# Patient Record
Sex: FEMALE | Race: WHITE | NOT HISPANIC OR LATINO | URBAN - METROPOLITAN AREA
[De-identification: names, ages, dates, MRNs, and addresses within clinical notes are randomized per-mention and may not be internally consistent; named-entity substitution may affect disease eponyms.]

---

## 2017-06-01 PROBLEM — Z00.00 ENCOUNTER FOR PREVENTIVE HEALTH EXAMINATION: Status: ACTIVE | Noted: 2017-06-01

## 2017-06-02 ENCOUNTER — OUTPATIENT (OUTPATIENT)
Dept: OUTPATIENT SERVICES | Facility: HOSPITAL | Age: 67
LOS: 1 days | End: 2017-06-02

## 2017-06-12 ENCOUNTER — OUTPATIENT (OUTPATIENT)
Dept: OUTPATIENT SERVICES | Facility: HOSPITAL | Age: 67
LOS: 1 days | End: 2017-06-12

## 2017-06-15 ENCOUNTER — APPOINTMENT (OUTPATIENT)
Dept: CARDIOLOGY | Facility: CLINIC | Age: 67
End: 2017-06-15

## 2018-01-17 ENCOUNTER — RECORD ABSTRACTING (OUTPATIENT)
Age: 68
End: 2018-01-17

## 2018-03-05 ENCOUNTER — OUTPATIENT (OUTPATIENT)
Dept: OUTPATIENT SERVICES | Facility: HOSPITAL | Age: 68
LOS: 1 days | End: 2018-03-05

## 2018-04-19 ENCOUNTER — OUTPATIENT (OUTPATIENT)
Dept: OUTPATIENT SERVICES | Facility: HOSPITAL | Age: 68
LOS: 1 days | End: 2018-04-19
Payer: MEDICARE

## 2018-04-19 PROCEDURE — 76536 US EXAM OF HEAD AND NECK: CPT | Mod: 26

## 2018-06-20 ENCOUNTER — RECORD ABSTRACTING (OUTPATIENT)
Age: 68
End: 2018-06-20

## 2018-06-20 DIAGNOSIS — Z87.891 PERSONAL HISTORY OF NICOTINE DEPENDENCE: ICD-10-CM

## 2018-06-20 DIAGNOSIS — Z82.49 FAMILY HISTORY OF ISCHEMIC HEART DISEASE AND OTHER DISEASES OF THE CIRCULATORY SYSTEM: ICD-10-CM

## 2018-06-20 DIAGNOSIS — Z78.9 OTHER SPECIFIED HEALTH STATUS: ICD-10-CM

## 2018-06-20 DIAGNOSIS — R21 RASH AND OTHER NONSPECIFIC SKIN ERUPTION: ICD-10-CM

## 2018-06-20 DIAGNOSIS — H26.9 UNSPECIFIED CATARACT: ICD-10-CM

## 2018-06-20 DIAGNOSIS — Z82.3 FAMILY HISTORY OF STROKE: ICD-10-CM

## 2018-06-23 ENCOUNTER — OUTPATIENT (OUTPATIENT)
Dept: OUTPATIENT SERVICES | Facility: HOSPITAL | Age: 68
LOS: 1 days | End: 2018-06-23

## 2018-06-27 ENCOUNTER — TRANSCRIPTION ENCOUNTER (OUTPATIENT)
Age: 68
End: 2018-06-27

## 2018-06-27 ENCOUNTER — NON-APPOINTMENT (OUTPATIENT)
Age: 68
End: 2018-06-27

## 2018-06-27 ENCOUNTER — APPOINTMENT (OUTPATIENT)
Dept: CARDIOLOGY | Facility: CLINIC | Age: 68
End: 2018-06-27
Payer: MEDICARE

## 2018-06-27 VITALS
DIASTOLIC BLOOD PRESSURE: 83 MMHG | BODY MASS INDEX: 27.99 KG/M2 | HEART RATE: 72 BPM | HEIGHT: 65 IN | WEIGHT: 168 LBS | SYSTOLIC BLOOD PRESSURE: 133 MMHG

## 2018-06-27 PROCEDURE — 99213 OFFICE O/P EST LOW 20 MIN: CPT

## 2018-06-27 PROCEDURE — 93000 ELECTROCARDIOGRAM COMPLETE: CPT

## 2018-06-27 RX ORDER — ADHESIVE TAPE 3"X 2.3 YD
50 MCG TAPE, NON-MEDICATED TOPICAL
Refills: 0 | Status: ACTIVE | COMMUNITY

## 2018-06-27 RX ORDER — LEVOTHYROXINE SODIUM 75 UG/1
75 TABLET ORAL DAILY
Refills: 0 | Status: ACTIVE | COMMUNITY

## 2018-09-12 ENCOUNTER — APPOINTMENT (OUTPATIENT)
Dept: CARDIOLOGY | Facility: CLINIC | Age: 68
End: 2018-09-12
Payer: MEDICARE

## 2018-09-12 PROCEDURE — 93306 TTE W/DOPPLER COMPLETE: CPT

## 2018-09-12 PROCEDURE — 93880 EXTRACRANIAL BILAT STUDY: CPT

## 2018-09-20 ENCOUNTER — APPOINTMENT (OUTPATIENT)
Dept: CARDIOLOGY | Facility: CLINIC | Age: 68
End: 2018-09-20
Payer: MEDICARE

## 2018-09-20 VITALS
BODY MASS INDEX: 27.49 KG/M2 | WEIGHT: 165 LBS | HEIGHT: 65 IN | DIASTOLIC BLOOD PRESSURE: 70 MMHG | OXYGEN SATURATION: 98 % | SYSTOLIC BLOOD PRESSURE: 140 MMHG | HEART RATE: 69 BPM

## 2018-09-20 PROCEDURE — 99214 OFFICE O/P EST MOD 30 MIN: CPT

## 2018-09-20 RX ORDER — ATORVASTATIN CALCIUM 20 MG/1
20 TABLET, FILM COATED ORAL DAILY
Qty: 90 | Refills: 1 | Status: DISCONTINUED | COMMUNITY
Start: 1900-01-01 | End: 2018-09-20

## 2018-12-20 ENCOUNTER — OUTPATIENT (OUTPATIENT)
Dept: OUTPATIENT SERVICES | Facility: HOSPITAL | Age: 68
LOS: 1 days | End: 2018-12-20

## 2019-03-13 ENCOUNTER — MEDICATION RENEWAL (OUTPATIENT)
Age: 69
End: 2019-03-13

## 2019-03-16 ENCOUNTER — OUTPATIENT (OUTPATIENT)
Dept: OUTPATIENT SERVICES | Facility: HOSPITAL | Age: 69
LOS: 1 days | End: 2019-03-16
Payer: MEDICARE

## 2019-03-16 PROCEDURE — 76536 US EXAM OF HEAD AND NECK: CPT | Mod: 26

## 2019-03-21 ENCOUNTER — APPOINTMENT (OUTPATIENT)
Dept: CARDIOLOGY | Facility: CLINIC | Age: 69
End: 2019-03-21
Payer: MEDICARE

## 2019-03-21 VITALS
DIASTOLIC BLOOD PRESSURE: 70 MMHG | SYSTOLIC BLOOD PRESSURE: 124 MMHG | WEIGHT: 163 LBS | HEART RATE: 71 BPM | OXYGEN SATURATION: 96 % | BODY MASS INDEX: 27.12 KG/M2

## 2019-03-21 DIAGNOSIS — E03.9 HYPOTHYROIDISM, UNSPECIFIED: ICD-10-CM

## 2019-03-21 PROCEDURE — 99214 OFFICE O/P EST MOD 30 MIN: CPT

## 2019-03-21 NOTE — ADDENDUM
[FreeTextEntry1] : Please note the patient was reviewed with DESIREE Katz.\par I was physically present during the service of the patient\par I was directly involved in the management plan and recommendations of care provided to the patient. \par I personally reviewed the history and physical exam and examination as documented by the PA above.\par 03/21/2019\par

## 2019-03-21 NOTE — ASSESSMENT
[FreeTextEntry1] : Abnormal EKG. \par Echocardiogram reviewed.  9-18\par Normal left ventricular function. \par Mild valvular heart disease.\par \par Hyperlipidemia. \par Atherosclerosis\par Possible ADR, return to crestor, with occasional myalgia.\par Will decrease to 10 mg if symptoms progressive, and notify me.\par Adjunctive dietary measures. \par \par \par Up-to-date on primary care maintenance to include colonoscopy which occurred 3 years ago, has regular gynecology followup with Pap smear, breast exam, mammogram, ophtho.\par \par She is interested in possible second pneumonia vaccination however it is unclear which vaccine was had in 2015, she will contact her pharmacy and determine if it was Pneumovax or Prevnar.\par \par She will have six-month followup or as otherwise dictated by change in her symptoms.

## 2019-03-21 NOTE — HISTORY OF PRESENT ILLNESS
[FreeTextEntry1] : ERIBERTO RAMOS  is a 68year old  F\par \par \par Hx hypothyroidism with thyroid nodules, negative thyroid biopsy, hyperlipidemia, atherosclerosis, mild VHD and osteopenia/osteoporosis. \par \par There is no prior history of a clinical myocardial infarction, coronary revascularization. \par There is no history of symptomatic congestive heart failure rheumatic heart disease or valvular disease.\par There is no history of symptomatic arrhythmias including atrial fibrillation.\par \par There is chest burning, and more awareness of her muscles on the atorvastatin. \par There is no exertional chest pain, pressure or discomfort. \par There is no significant dyspnea on exertion or orthopnea. \par There are no symptomatic palpitations, lightheadedness, dizziness or syncope.\par \par She has chronic knee pain and is planning on pursuing left knee replacement with Dr. Culver at some point in the future, this has remained unchanged, likely will be done around summer time. \par She walks as tolerated by knee pain. \par \par There is intermittent complaints of lower extremity and upper extremity myalgias, which is not frequent. The patient elects to remain on her current dose of Crestor which improved cholesterol profile however will have her dose of Crestor if the symptoms become persistent and notify me should they improve.\par \par EKG demonstrates normal sinus rhythm with possible inferior MI and possible anteroseptal MI. \par \par Labs dated March 16, 2019 with a normal CMP, total cholesterol 191, , he still 64, triglycerides 87, . TSH 1.64 free thyroxine 1.89.\par \par June 2018. Hemoglobin 13.7, potassium 3.9, HDL 71, , total cholesterol 212, TSH 2.0. \par  \par Carotid Doppler. September 2018. Mild plaque. Nonobstructive disease. \par Echocardiogram. Normal left ventricular function. Mild mitral regurgitation. Mild tricuspid regurgitation. \par

## 2019-03-21 NOTE — REVIEW OF SYSTEMS
[see HPI] : see HPI [Joint Pain] : joint pain [Joint Stiffness] : joint stiffness [Muscle Cramps] : muscle cramps [Negative] : Heme/Lymph

## 2019-03-21 NOTE — PHYSICAL EXAM
[General Appearance - Well Developed] : well developed [Well Groomed] : well groomed [Normal Appearance] : normal appearance [General Appearance - Well Nourished] : well nourished [General Appearance - In No Acute Distress] : no acute distress [No Deformities] : no deformities [Normal Conjunctiva] : the conjunctiva exhibited no abnormalities [Eyelids - No Xanthelasma] : the eyelids demonstrated no xanthelasmas [Normal Oral Mucosa] : normal oral mucosa [No Oral Pallor] : no oral pallor [No Oral Cyanosis] : no oral cyanosis [Auscultation Breath Sounds / Voice Sounds] : lungs were clear to auscultation bilaterally [Respiration, Rhythm And Depth] : normal respiratory rhythm and effort [Heart Rate And Rhythm] : heart rate and rhythm were normal [Exaggerated Use Of Accessory Muscles For Inspiration] : no accessory muscle use [Heart Sounds] : normal S1 and S2 [Murmurs] : no murmurs present [Abdomen Tenderness] : non-tender [Abdomen Soft] : soft [Abdomen Mass (___ Cm)] : no abdominal mass palpated [Abnormal Walk] : normal gait [Gait - Sufficient For Exercise Testing] : the gait was sufficient for exercise testing [Cyanosis, Localized] : no localized cyanosis [Nail Clubbing] : no clubbing of the fingernails [Petechial Hemorrhages (___cm)] : no petechial hemorrhages [Skin Color & Pigmentation] : normal skin color and pigmentation [No Venous Stasis] : no venous stasis [] : no rash [No Skin Ulcers] : no skin ulcer [Skin Lesions] : no skin lesions [No Xanthoma] : no  xanthoma was observed [Affect] : the affect was normal [Oriented To Time, Place, And Person] : oriented to person, place, and time [No Anxiety] : not feeling anxious [Mood] : the mood was normal [FreeTextEntry1] : dec upstroke

## 2019-03-21 NOTE — REASON FOR VISIT
[Hyperlipidemia] : hyperlipidemia [Abnormal ECG] : an abnormal ECG [Consultation] : a consultation regarding [Medication Management] : Medication management

## 2019-03-23 ENCOUNTER — TRANSCRIPTION ENCOUNTER (OUTPATIENT)
Age: 69
End: 2019-03-23

## 2019-09-21 ENCOUNTER — OUTPATIENT (OUTPATIENT)
Dept: OUTPATIENT SERVICES | Facility: HOSPITAL | Age: 69
LOS: 1 days | End: 2019-09-21

## 2019-09-24 ENCOUNTER — APPOINTMENT (OUTPATIENT)
Dept: CARDIOLOGY | Facility: CLINIC | Age: 69
End: 2019-09-24
Payer: MEDICARE

## 2019-09-24 ENCOUNTER — NON-APPOINTMENT (OUTPATIENT)
Age: 69
End: 2019-09-24

## 2019-09-24 VITALS
HEIGHT: 65 IN | WEIGHT: 157 LBS | BODY MASS INDEX: 26.16 KG/M2 | OXYGEN SATURATION: 99 % | DIASTOLIC BLOOD PRESSURE: 60 MMHG | SYSTOLIC BLOOD PRESSURE: 132 MMHG | HEART RATE: 74 BPM

## 2019-09-24 PROCEDURE — 99215 OFFICE O/P EST HI 40 MIN: CPT

## 2019-09-24 PROCEDURE — 93000 ELECTROCARDIOGRAM COMPLETE: CPT

## 2019-09-24 NOTE — REVIEW OF SYSTEMS
[see HPI] : see HPI [Joint Pain] : joint pain [Joint Stiffness] : joint stiffness [Muscle Cramps] : muscle cramps [Negative] : Heme/Lymph [Shortness Of Breath] : shortness of breath

## 2019-11-04 ENCOUNTER — APPOINTMENT (OUTPATIENT)
Dept: CARDIOLOGY | Facility: CLINIC | Age: 69
End: 2019-11-04
Payer: MEDICARE

## 2019-11-04 PROCEDURE — 93015 CV STRESS TEST SUPVJ I&R: CPT

## 2019-11-04 PROCEDURE — 93306 TTE W/DOPPLER COMPLETE: CPT

## 2019-11-04 PROCEDURE — A9502: CPT

## 2019-11-04 PROCEDURE — 78452 HT MUSCLE IMAGE SPECT MULT: CPT

## 2019-11-12 ENCOUNTER — APPOINTMENT (OUTPATIENT)
Dept: CARDIOLOGY | Facility: CLINIC | Age: 69
End: 2019-11-12
Payer: MEDICARE

## 2019-11-12 VITALS
SYSTOLIC BLOOD PRESSURE: 126 MMHG | WEIGHT: 160 LBS | HEIGHT: 65 IN | HEART RATE: 76 BPM | BODY MASS INDEX: 26.66 KG/M2 | DIASTOLIC BLOOD PRESSURE: 70 MMHG

## 2019-11-12 DIAGNOSIS — R06.02 SHORTNESS OF BREATH: ICD-10-CM

## 2019-11-12 PROCEDURE — 99214 OFFICE O/P EST MOD 30 MIN: CPT

## 2019-11-12 NOTE — REVIEW OF SYSTEMS
[Shortness Of Breath] : shortness of breath [see HPI] : see HPI [Joint Pain] : joint pain [Joint Stiffness] : joint stiffness [Muscle Cramps] : muscle cramps [Negative] : Heme/Lymph

## 2019-11-12 NOTE — HISTORY OF PRESENT ILLNESS
[FreeTextEntry1] : ERIBERTO RAMOS  is a very pleasant 68 year old  F\par \par \par Hx hypothyroidism with thyroid nodules, negative thyroid biopsy, hyperlipidemia, atherosclerosis, mild VHD and osteopenia/osteoporosis. \par \par There is no prior history of a clinical myocardial infarction, coronary revascularization. \par There is no history of symptomatic congestive heart failure rheumatic heart disease or valvular disease.\par There is no history of symptomatic arrhythmias including atrial fibrillation.\par \par There is intermittent b/l leg cramping at night on rousuvastatin. \par There is no exertional chest pain, pressure or discomfort. \par There are no symptomatic palpitations, lightheadedness, dizziness or syncope.\par \par She has chronic knee pain and is planning on pursuing left knee replacement with Dr. Culver at some point in the near future. \par She walks as tolerated by knee pain. \par \par There is intermittent complaints of lower extremity and upper extremity myalgias, which is not frequent. \par \par  Nuclear stress test 11/04/19.  Lexiscan.  No evidence of ischemia by EKG. Normal myocardial perfusion.  \par \par Echocardiogram 11/04/19.  EF: 60%.  Minimal MR and PI.  MILD TR. Mild diastolic dysfunction.  borderline PHTN.  Color flow shunt consistent with PFO.\par \par No hx of neurological symptoms. \par \par 9/21/19 CBC: WNL. Na+: 139, K+: 2.2, glucose: 89, BUN 17, creatinine: 0.6, calcium colon 10.0. AST: 26, ALT: 22. Cholesterol: 195, LDL: 118, HDL: 60, triglycerides: 86. CK: 131.\par \par Labs dated March 16, 2019 with a normal CMP, total cholesterol 191, , he still 64, triglycerides 87, . TSH 1.64 free thyroxine 1.89.\par \par June 2018. Hemoglobin 13.7, potassium 3.9, HDL 71, , total cholesterol 212, TSH 2.0. \par  \par Carotid Doppler. September 2018. Mild plaque. Nonobstructive disease. \par Echocardiogram. Normal left ventricular function. Mild mitral regurgitation. Mild tricuspid regurgitation. \par

## 2019-11-12 NOTE — ASSESSMENT
[FreeTextEntry1] : \par Echocardiogram reviewed.\par Normal left ventricular function. \par Mild valvular heart disease.\par PFO noted.  No neurological symptoms.  No contraindications to ASA.  Continue. \par Red flag symptoms that would warrant emergent evaluation discussed.  Pt verbalizes understanding. \par \par Hyperlipidemia. \par Atherosclerosis\par Possible ADR, with occasional myalgia.  decrease Crestor to 10mg QD and reevaluate in a few weeks. \par Adjunctive dietary measures. \par \par Nonobstructive carotid disease.  No FND.  Start ASA as above.

## 2019-11-12 NOTE — PHYSICAL EXAM
[General Appearance - Well Developed] : well developed [Well Groomed] : well groomed [Normal Appearance] : normal appearance [General Appearance - Well Nourished] : well nourished [No Deformities] : no deformities [General Appearance - In No Acute Distress] : no acute distress [Normal Conjunctiva] : the conjunctiva exhibited no abnormalities [Eyelids - No Xanthelasma] : the eyelids demonstrated no xanthelasmas [Normal Oral Mucosa] : normal oral mucosa [No Oral Pallor] : no oral pallor [No Oral Cyanosis] : no oral cyanosis [Respiration, Rhythm And Depth] : normal respiratory rhythm and effort [Exaggerated Use Of Accessory Muscles For Inspiration] : no accessory muscle use [Auscultation Breath Sounds / Voice Sounds] : lungs were clear to auscultation bilaterally [Heart Rate And Rhythm] : heart rate and rhythm were normal [Heart Sounds] : normal S1 and S2 [Murmurs] : no murmurs present [Abdomen Soft] : soft [Abdomen Tenderness] : non-tender [Abdomen Mass (___ Cm)] : no abdominal mass palpated [Abnormal Walk] : normal gait [Gait - Sufficient For Exercise Testing] : the gait was sufficient for exercise testing [Nail Clubbing] : no clubbing of the fingernails [Cyanosis, Localized] : no localized cyanosis [Petechial Hemorrhages (___cm)] : no petechial hemorrhages [Skin Color & Pigmentation] : normal skin color and pigmentation [] : no rash [No Venous Stasis] : no venous stasis [Skin Lesions] : no skin lesions [No Skin Ulcers] : no skin ulcer [No Xanthoma] : no  xanthoma was observed [Affect] : the affect was normal [Oriented To Time, Place, And Person] : oriented to person, place, and time [Mood] : the mood was normal [No Anxiety] : not feeling anxious [FreeTextEntry1] : dec upstroke

## 2019-11-12 NOTE — REASON FOR VISIT
[Follow-Up - Clinic] : a clinic follow-up of [Abnormal ECG] : an abnormal ECG [Hyperlipidemia] : hyperlipidemia [Medication Management] : Medication management [FreeTextEntry2] : Review testing.

## 2019-11-12 NOTE — REASON FOR VISIT
[Follow-Up - Clinic] : a clinic follow-up of [Hyperlipidemia] : hyperlipidemia [Abnormal ECG] : an abnormal ECG [Medication Management] : Medication management [FreeTextEntry2] : Review testing.

## 2019-11-12 NOTE — PHYSICAL EXAM
[General Appearance - Well Developed] : well developed [Normal Appearance] : normal appearance [Well Groomed] : well groomed [No Deformities] : no deformities [General Appearance - Well Nourished] : well nourished [General Appearance - In No Acute Distress] : no acute distress [Heart Rate And Rhythm] : heart rate and rhythm were normal [Heart Sounds] : normal S1 and S2 [Murmurs] : no murmurs present [Respiration, Rhythm And Depth] : normal respiratory rhythm and effort [Exaggerated Use Of Accessory Muscles For Inspiration] : no accessory muscle use [Auscultation Breath Sounds / Voice Sounds] : lungs were clear to auscultation bilaterally [Abdomen Soft] : soft [Abdomen Tenderness] : non-tender [Abdomen Mass (___ Cm)] : no abdominal mass palpated [Nail Clubbing] : no clubbing of the fingernails [Cyanosis, Localized] : no localized cyanosis [Petechial Hemorrhages (___cm)] : no petechial hemorrhages [Normal Conjunctiva] : the conjunctiva exhibited no abnormalities [Eyelids - No Xanthelasma] : the eyelids demonstrated no xanthelasmas [Normal Oral Mucosa] : normal oral mucosa [No Oral Pallor] : no oral pallor [No Oral Cyanosis] : no oral cyanosis [Abnormal Walk] : normal gait [Gait - Sufficient For Exercise Testing] : the gait was sufficient for exercise testing [Skin Color & Pigmentation] : normal skin color and pigmentation [No Venous Stasis] : no venous stasis [] : no rash [Skin Lesions] : no skin lesions [No Skin Ulcers] : no skin ulcer [No Xanthoma] : no  xanthoma was observed [Oriented To Time, Place, And Person] : oriented to person, place, and time [Affect] : the affect was normal [Mood] : the mood was normal [No Anxiety] : not feeling anxious [FreeTextEntry1] : dec upstroke

## 2020-01-27 ENCOUNTER — APPOINTMENT (OUTPATIENT)
Dept: ORTHOPEDIC SURGERY | Facility: CLINIC | Age: 70
End: 2020-01-27
Payer: MEDICARE

## 2020-01-27 VITALS
BODY MASS INDEX: 26.66 KG/M2 | WEIGHT: 160 LBS | DIASTOLIC BLOOD PRESSURE: 93 MMHG | HEART RATE: 85 BPM | SYSTOLIC BLOOD PRESSURE: 155 MMHG | HEIGHT: 65 IN

## 2020-01-27 DIAGNOSIS — M19.041 PRIMARY OSTEOARTHRITIS, RIGHT HAND: ICD-10-CM

## 2020-01-27 DIAGNOSIS — Z87.39 PERSONAL HISTORY OF OTHER DISEASES OF THE MUSCULOSKELETAL SYSTEM AND CONNECTIVE TISSUE: ICD-10-CM

## 2020-01-27 DIAGNOSIS — M19.042 PRIMARY OSTEOARTHRITIS, LEFT HAND: ICD-10-CM

## 2020-01-27 DIAGNOSIS — M67.442 GANGLION, LEFT HAND: ICD-10-CM

## 2020-01-27 PROCEDURE — 73130 X-RAY EXAM OF HAND: CPT | Mod: 50

## 2020-01-27 PROCEDURE — 20612 ASPIRATE/INJ GANGLION CYST: CPT | Mod: F1

## 2020-01-27 PROCEDURE — 99203 OFFICE O/P NEW LOW 30 MIN: CPT | Mod: 25

## 2020-01-27 NOTE — PROCEDURE
[FreeTextEntry1] : Using sterile technique, the cyst was aspirated. She understands that the cyst may recur. If it does recur, then she will return to the office to discuss further treatment recommendations.

## 2020-01-27 NOTE — HISTORY OF PRESENT ILLNESS
[Right] : right hand dominant [FreeTextEntry1] : She comes in today for evaluation of bilateral hand and thumb pain that began 3 years ago. She denies any numbness and tingling. She states she occasionally takes Ibuprofen with relief.\par \par She also comes in for evaluation of a left index finger cyst that appeared 1 year ago. She states that she has intermittent pain at the nail bed. She rates this pain a 4 out of 10 at this time. \par \par She had tendon repair surgery in the past.\par \par She was accompanied by her .

## 2020-01-27 NOTE — ADDENDUM
[FreeTextEntry1] : I, Reji Cordova, acted solely as a scribe for Dr. Peña on this date 01/27/2020.\par

## 2020-01-27 NOTE — PHYSICAL EXAM
[de-identified] : - Constitutional: This is a female in no obvious distress. She was accompanied by her .\par - Psych: Patient is alert and oriented to person, place and time.  Patient has a normal mood and affect.\par - Cardiovascular: Normal pulses throughout the upper extremities.  No significant varicosities are noted in the upper extremities. \par - Neuro: Strength and sensation are intact throughout the upper extremities.  Patient has normal coordination.\par - Respiratory:  Patient exhibits no evidence of shortness of breath or difficulty breathing.\par - Skin: No rashes, lesions, or other abnormalities are noted in the upper extremities.\par \par ---\par \par Examination of her bilateral hands demonstrate obvious arthritis.  This is most notable at the DIP joints as well as along the CMC joints of the thumbs.  She has some tenderness along the CMC joints and limitation of terminal flexion and extension.  Examination of her left index finger demonstrates a ganglion cyst emanating from the dorsal aspect of the middle phalanx.  There is some tenderness.  She is neurovascularly intact distally. [de-identified] : AP, lateral oblique radiographs of both hands demonstrates arthritis.  She has right greater than left CMC joint arthritis of the thumbs as well as arthritis of the DIP joints, most notably at the left middle finger.

## 2020-01-27 NOTE — END OF VISIT
[FreeTextEntry3] : All medical record entries made by the Scribe were at my, Dr. Peña, direction and personally dictated by me on 01/27/2020. I have reviewed the chart and agree that the record accurately reflects my personal performances of the history, physical exam, assessment, and plan. I have also personally directed, reviewed, and agreed with the chart.\par

## 2020-01-27 NOTE — DISCUSSION/SUMMARY
[FreeTextEntry1] : She has findings consistent with bilateral hand pain secondary to arthritis, where she is most symptomatic at the CMC joints of the thumbs.  She also has a left index finger ganglion cyst dorsally, proximal to the DIP joint.\par \par I had a discussion regarding today's visit, the diagnosis, and treatment recommendations / options. With regard to her bilateral hand pain secondary to CMC joint arthritis, we discussed treatment options either a cortisone injection or symptomatic treatment.  She deferred a cortisone injection at this time.  She will take anti-inflammatories and apply ice as needed.  If her symptoms worsen, then she will follow-up for a cortisone injection.  \par \par With regard to her left index cyst, she agreed to aspiration.  She understands that there is a relatively high likelihood of recurrence.  If the cyst recurs, she would like to consider surgical removal at some time after her knee surgery.\par \par The patient has agreed to this plan of management and has expressed full understanding.  All questions were fully answered to the patient's satisfaction.\par \par Over 50% of the time spent with the patient was on counseling the patient on the above diagnosis, treatment plan and prognosis.\par

## 2020-02-12 ENCOUNTER — OUTPATIENT (OUTPATIENT)
Dept: OUTPATIENT SERVICES | Facility: HOSPITAL | Age: 70
LOS: 1 days | End: 2020-02-12

## 2020-02-19 ENCOUNTER — APPOINTMENT (OUTPATIENT)
Dept: CARDIOLOGY | Facility: CLINIC | Age: 70
End: 2020-02-19
Payer: MEDICARE

## 2020-02-19 VITALS
OXYGEN SATURATION: 96 % | BODY MASS INDEX: 29.41 KG/M2 | HEIGHT: 63 IN | WEIGHT: 166 LBS | SYSTOLIC BLOOD PRESSURE: 126 MMHG | RESPIRATION RATE: 17 BRPM | DIASTOLIC BLOOD PRESSURE: 70 MMHG | HEART RATE: 82 BPM

## 2020-02-19 DIAGNOSIS — Q21.1 ATRIAL SEPTAL DEFECT: ICD-10-CM

## 2020-02-19 DIAGNOSIS — Z01.810 ENCOUNTER FOR PREPROCEDURAL CARDIOVASCULAR EXAMINATION: ICD-10-CM

## 2020-02-19 PROCEDURE — 99214 OFFICE O/P EST MOD 30 MIN: CPT

## 2020-02-19 RX ORDER — ASPIRIN 81 MG
81 TABLET, DELAYED RELEASE (ENTERIC COATED) ORAL DAILY
Refills: 0 | Status: ACTIVE | COMMUNITY

## 2020-02-19 RX ORDER — MULTIVIT WITH CALCIUM,IRON,MIN
TABLET ORAL
Refills: 0 | Status: DISCONTINUED | COMMUNITY
End: 2020-02-19

## 2020-02-19 NOTE — REVIEW OF SYSTEMS
[Shortness Of Breath] : shortness of breath [Joint Pain] : joint pain [see HPI] : see HPI [Joint Stiffness] : joint stiffness [Muscle Cramps] : muscle cramps [Negative] : Heme/Lymph

## 2020-02-28 NOTE — HISTORY OF PRESENT ILLNESS
[FreeTextEntry1] : ERIBERTO RAMOS  is a very pleasant 69 year old  F\par \par \par Hx hypothyroidism with thyroid nodules, negative thyroid biopsy, hyperlipidemia, atherosclerosis, mild VHD and osteopenia/osteoporosis. \par \par There is no prior history of a clinical myocardial infarction, coronary revascularization. \par There is no history of symptomatic congestive heart failure rheumatic heart disease or valvular disease.\par There is no history of symptomatic arrhythmias including atrial fibrillation.\par \par There is intermittent b/l leg cramping at night on rousuvastatin. \par There is no exertional chest pain, pressure or discomfort. \par There are no symptomatic palpitations, lightheadedness, dizziness or syncope.\par \par She has chronic knee pain and is scheduled to have TKR with Dr. Camacho on 3/3/2020 at [facility].  Outside EKG was reviewed.  NSR with T wave inversions anteroseptally.  This is chronic.  Nuclear stress test from 11/2019 revealed normal myocardial perfusion.  \par She walks as tolerated by knee pain. \par \par  Nuclear stress test 11/04/19.  Lexiscan.  No evidence of ischemia by EKG. Normal myocardial perfusion.  \par \par Echocardiogram 11/04/19.  EF: 60%.  Minimal MR and PI.  MILD TR. Mild diastolic dysfunction.  borderline PHTN.  Color flow shunt consistent with PFO.\par \par No hx of neurological symptoms. \par \par 9/21/19 CBC: WNL. Na+: 139, K+: 2.2, glucose: 89, BUN 17, creatinine: 0.6, calcium colon 10.0. AST: 26, ALT: 22. Cholesterol: 195, LDL: 118, HDL: 60, triglycerides: 86. CK: 131.\par \par Labs dated March 16, 2019 with a normal CMP, total cholesterol 191, , he still 64, triglycerides 87, . TSH 1.64 free thyroxine 1.89.\par \par June 2018. Hemoglobin 13.7, potassium 3.9, HDL 71, , total cholesterol 212, TSH 2.0. \par  \par Carotid Doppler. September 2018. Mild plaque. Nonobstructive disease. \par Echocardiogram. Normal left ventricular function. Mild mitral regurgitation. Mild tricuspid regurgitation. \par

## 2020-02-28 NOTE — ASSESSMENT
[FreeTextEntry1] : PFO.  No neurological symptoms.  No contraindications to ASA.  Continue. \par Red flag symptoms that would warrant emergent evaluation discussed.  Pt verbalizes understanding. \par \par Hyperlipidemia. \par Atherosclerosis\par Possible ADR, with occasional myalgia.  Improved with Crestor 10mg QD. \par Adjunctive dietary measures. \par \par Nonobstructive carotid disease.  No FND.  Start ASA as above.  \par \par Preop evaluation:\par At present, there are no active cardiac conditions. \par No recent unstable coronary syndromes, decompensated heart failure, severe valvular heart disease or significant dysrhythmias. \par Baseline functional status is acceptable. \par The clinical benefit of the proposed procedure outweighs the associated cardiovascular risk. \par Risk not attenuated with further CV testing. \par Prior testing as outlined above.\par Optimized from a cardiovascular perspective. \par May hold ASA as necessary.

## 2020-03-19 ENCOUNTER — APPOINTMENT (OUTPATIENT)
Dept: CARDIOLOGY | Facility: CLINIC | Age: 70
End: 2020-03-19

## 2020-05-07 ENCOUNTER — APPOINTMENT (OUTPATIENT)
Dept: CARDIOLOGY | Facility: CLINIC | Age: 70
End: 2020-05-07

## 2020-08-21 ENCOUNTER — APPOINTMENT (OUTPATIENT)
Dept: CARDIOLOGY | Facility: CLINIC | Age: 70
End: 2020-08-21
Payer: MEDICARE

## 2020-08-21 VITALS
WEIGHT: 163 LBS | DIASTOLIC BLOOD PRESSURE: 68 MMHG | SYSTOLIC BLOOD PRESSURE: 120 MMHG | TEMPERATURE: 97.1 F | HEART RATE: 85 BPM | OXYGEN SATURATION: 99 % | BODY MASS INDEX: 27.16 KG/M2 | HEIGHT: 65 IN

## 2020-08-21 PROCEDURE — 99213 OFFICE O/P EST LOW 20 MIN: CPT

## 2020-08-21 RX ORDER — ROSUVASTATIN CALCIUM 20 MG/1
20 TABLET, FILM COATED ORAL
Qty: 90 | Refills: 1 | Status: DISCONTINUED | COMMUNITY
Start: 2018-09-20 | End: 2020-08-21

## 2020-08-21 NOTE — REASON FOR VISIT
[Consultation] : a consultation regarding [Abnormal ECG] : an abnormal ECG [Hyperlipidemia] : hyperlipidemia [Medication Management] : Medication management

## 2020-08-22 ENCOUNTER — OUTPATIENT (OUTPATIENT)
Dept: OUTPATIENT SERVICES | Facility: HOSPITAL | Age: 70
LOS: 1 days | End: 2020-08-22

## 2020-08-23 NOTE — ASSESSMENT
[FreeTextEntry1] : \par Echocardiogram reviewed. \par Normal left ventricular function. \par Mild valvular heart disease.\par Possible intra-atrial shunting.  \par Monitor pulmonary pressures and right-sided structures. \par \par Hyperlipidemia. \par Atherosclerosis\par ADR to lipitor and now crestor \par Adjunctive dietary measures. \par Changed to pravastatin.  \par \par Follow-up carotid Doppler, echo and blood work.  \par Discussed novel non-statin lipid-lowering therapies. \par

## 2020-08-23 NOTE — HISTORY OF PRESENT ILLNESS
[FreeTextEntry1] : ERIBERTO RAMOS  is a 69 year old  F\par \par Hx hypothyroidism with thyroid nodules, negative thyroid biopsy, hyperlipidemia, atherosclerosis, mild VHD and osteopenia/osteoporosis. \par \par There is no prior history of a clinical myocardial infarction, coronary revascularization. \par There is no history of symptomatic congestive heart failure rheumatic heart disease or valvular disease.\par There is no history of symptomatic arrhythmias including atrial fibrillation.\par \par There is no exertional chest pain, pressure or discomfort. \par There is no significant dyspnea on exertion or orthopnea. \par There are no symptomatic palpitations, lightheadedness, dizziness or syncope.\par \par In the interim she had a knee replacement.  She worked with physical therapy and is active walking walking and with strengthening exercises.  \par Previously had intolerance to Lipitor.  She is having similar symptoms with rosuvastatin.  \par \par Blood work September 2019 potassium 5.2 creatinine 0.6 total cholesterol 195  hemoglobin 14.4.  \par Last EKG describes nonspecific ST changes \par nuclear stress test in November 2019 no ischemia \par echocardiogram November 2019 normal left ventricular function mild valvular heart disease borderline pulmonary pressures and possible PFO.  \par Carotid Doppler. September 2018. Mild plaque. Nonobstructive disease.

## 2020-08-23 NOTE — PHYSICAL EXAM
[Normal Appearance] : normal appearance [General Appearance - Well Developed] : well developed [Well Groomed] : well groomed [General Appearance - Well Nourished] : well nourished [No Deformities] : no deformities [Eyelids - No Xanthelasma] : the eyelids demonstrated no xanthelasmas [General Appearance - In No Acute Distress] : no acute distress [Normal Conjunctiva] : the conjunctiva exhibited no abnormalities [No Oral Cyanosis] : no oral cyanosis [Normal Oral Mucosa] : normal oral mucosa [No Oral Pallor] : no oral pallor [Respiration, Rhythm And Depth] : normal respiratory rhythm and effort [Exaggerated Use Of Accessory Muscles For Inspiration] : no accessory muscle use [Auscultation Breath Sounds / Voice Sounds] : lungs were clear to auscultation bilaterally [Heart Sounds] : normal S1 and S2 [Heart Rate And Rhythm] : heart rate and rhythm were normal [Abdomen Soft] : soft [Murmurs] : no murmurs present [Abdomen Tenderness] : non-tender [Abdomen Mass (___ Cm)] : no abdominal mass palpated [Abnormal Walk] : normal gait [Nail Clubbing] : no clubbing of the fingernails [Cyanosis, Localized] : no localized cyanosis [Gait - Sufficient For Exercise Testing] : the gait was sufficient for exercise testing [Petechial Hemorrhages (___cm)] : no petechial hemorrhages [Skin Color & Pigmentation] : normal skin color and pigmentation [] : no rash [Skin Lesions] : no skin lesions [No Skin Ulcers] : no skin ulcer [No Venous Stasis] : no venous stasis [No Xanthoma] : no  xanthoma was observed [Oriented To Time, Place, And Person] : oriented to person, place, and time [Affect] : the affect was normal [Mood] : the mood was normal [No Anxiety] : not feeling anxious [FreeTextEntry1] : dec upstroke

## 2020-09-22 ENCOUNTER — APPOINTMENT (OUTPATIENT)
Dept: CARDIOLOGY | Facility: CLINIC | Age: 70
End: 2020-09-22
Payer: MEDICARE

## 2020-09-22 PROCEDURE — 93306 TTE W/DOPPLER COMPLETE: CPT

## 2020-09-22 PROCEDURE — 93880 EXTRACRANIAL BILAT STUDY: CPT

## 2020-10-02 ENCOUNTER — APPOINTMENT (OUTPATIENT)
Dept: CARDIOLOGY | Facility: CLINIC | Age: 70
End: 2020-10-02
Payer: MEDICARE

## 2020-10-02 VITALS
TEMPERATURE: 97.3 F | OXYGEN SATURATION: 99 % | DIASTOLIC BLOOD PRESSURE: 80 MMHG | HEART RATE: 72 BPM | SYSTOLIC BLOOD PRESSURE: 140 MMHG | BODY MASS INDEX: 27.62 KG/M2 | WEIGHT: 166 LBS

## 2020-10-02 PROCEDURE — 99214 OFFICE O/P EST MOD 30 MIN: CPT

## 2020-10-02 NOTE — HISTORY OF PRESENT ILLNESS
[FreeTextEntry1] : ERIBERTO RAMOS  is a 69 year old  F with a history of hypothyroidism with thyroid nodules, negative thyroid biopsy, hyperlipidemia, atherosclerosis, mild VHD and osteopenia/osteoporosis. Additional hx of knee replacement.\par \par There is no prior history of a clinical myocardial infarction, coronary revascularization. \par There is no history of symptomatic congestive heart failure rheumatic heart disease or valvular disease.\par There is no history of symptomatic arrhythmias including atrial fibrillation.\par \par Last seen 8/21/20. In the interim there have been no hospitalizations or procedures. Presents today to review recent testing (echo, carotid, labs).  8/22/20, so her Pravastatin was increased to 40mg daily. She denies chest pain, pressure, palpitations, unusual shortness of breath, orthopnea, LE edema, lightheadedness, dizziness, near syncope or syncope. Attending 3x a week without exertional complaints.\par \par Of note, she reports she received the flu vaccine yesterday.\par \par Previously had intolerance to Lipitor.  She is having similar symptoms with rosuvastatin.  \par \par /80 on my exam. Reports consistent normal BPs at home. ('s).\par \par Testing:\par \par Echo 9/22/20: EF 65%. Mild MR. Normal wall motion. Mild TR. Mild [place]. A color doppler signal is seen along the atrial septum c/w PFO or small atrial septal defect. Compared with echo 11/4/19, no significant changes noted.\par \par Carotids 9/22/20: Mild nonobstructive carotid dz BL. \par \par Labs 8/22/20: WBC 5.95, Hgb 13.7, Hct 41.4, plt 243, K 4.4, Cr 0.7, AST 21, ALT 16, Trigs 119, HDL 63, , Chol 201, Mag 2.3, Hgb A1C 5.5, CRP 0.10, CK 90, BNP 85.8, TSH 2.060.\par \par Blood work September 2019 potassium 5.2 creatinine 0.6 total cholesterol 195  hemoglobin 14.4.  \par Last EKG describes nonspecific ST changes \par nuclear stress test in November 2019 no ischemia \par echocardiogram November 2019 normal left ventricular function mild valvular heart disease borderline pulmonary pressures and possible PFO.  \par Carotid Doppler. September 2018. Mild plaque. Nonobstructive disease.

## 2020-10-02 NOTE — PHYSICAL EXAM
[General Appearance - Well Developed] : well developed [Normal Appearance] : normal appearance [Well Groomed] : well groomed [General Appearance - Well Nourished] : well nourished [No Deformities] : no deformities [General Appearance - In No Acute Distress] : no acute distress [Normal Conjunctiva] : the conjunctiva exhibited no abnormalities [Eyelids - No Xanthelasma] : the eyelids demonstrated no xanthelasmas [Normal Oral Mucosa] : normal oral mucosa [No Oral Pallor] : no oral pallor [No Oral Cyanosis] : no oral cyanosis [Respiration, Rhythm And Depth] : normal respiratory rhythm and effort [Exaggerated Use Of Accessory Muscles For Inspiration] : no accessory muscle use [Auscultation Breath Sounds / Voice Sounds] : lungs were clear to auscultation bilaterally [Heart Rate And Rhythm] : heart rate and rhythm were normal [Heart Sounds] : normal S1 and S2 [Murmurs] : no murmurs present [Abdomen Soft] : soft [Abdomen Tenderness] : non-tender [Abdomen Mass (___ Cm)] : no abdominal mass palpated [Abnormal Walk] : normal gait [Gait - Sufficient For Exercise Testing] : the gait was sufficient for exercise testing [Nail Clubbing] : no clubbing of the fingernails [Cyanosis, Localized] : no localized cyanosis [Petechial Hemorrhages (___cm)] : no petechial hemorrhages [Skin Color & Pigmentation] : normal skin color and pigmentation [] : no rash [No Venous Stasis] : no venous stasis [Skin Lesions] : no skin lesions [No Skin Ulcers] : no skin ulcer [No Xanthoma] : no  xanthoma was observed [Oriented To Time, Place, And Person] : oriented to person, place, and time [Affect] : the affect was normal [Mood] : the mood was normal [No Anxiety] : not feeling anxious [FreeTextEntry1] : dec upstroke

## 2020-10-02 NOTE — ASSESSMENT
[FreeTextEntry1] : ERIBERTO RAMOS is a 69 year old F who presents today Oct 02, 2020 with the above history and the following active issues:\par \par \par Echocardiogram reviewed. \par Normal left ventricular function. \par Mild valvular heart disease.\par Possible intra-atrial shunting.  \par Monitor pulmonary pressures and right-sided structures. \par Continue ASA 81mg daily.\par \par Hyperlipidemia. \par Atherosclerosis\par ADR to lipitor and now crestor \par Adjunctive dietary measures. \par Continue Pravastatin.\par \par BP elevated today. Educated patient on low salt diet, alcohol intake in moderation, regular cardiovascular exercise, and weight reduction for improved BP control. Continue to monitor BP at home and call for persistently elevated readings (>140/90). \par \par F/U in 6 months with Dr. Ledezma.\par Discussed red flag symptoms, which would warrant sooner or emergent medical evaluation.\par Any questions and concerns were addressed and resolved.\par \par Sincerely,\par Kaylie Zheng FNP-BC\par Patient's history, testing, and plan was reviewed with supervising physician, Dr. William Ledezma\par

## 2020-10-02 NOTE — ADDENDUM
[FreeTextEntry1] : Please note the patient was seen and examined with NP Kaylie Zheng.\par I was physically present during the service of the patient and personally examined the patient. \par I was directly involved in the management plan and recommendations of the care provided to the patient. \par I personally reviewed the history and physical examination as documented by the NP above.\par 10/02/2020\par

## 2020-12-07 ENCOUNTER — NON-APPOINTMENT (OUTPATIENT)
Age: 70
End: 2020-12-07

## 2021-01-15 ENCOUNTER — NON-APPOINTMENT (OUTPATIENT)
Age: 71
End: 2021-01-15

## 2021-01-15 RX ORDER — PRAVASTATIN SODIUM 40 MG/1
40 TABLET ORAL
Qty: 90 | Refills: 3 | Status: DISCONTINUED | COMMUNITY
End: 2021-01-15

## 2021-01-19 ENCOUNTER — NON-APPOINTMENT (OUTPATIENT)
Age: 71
End: 2021-01-19

## 2021-01-19 ENCOUNTER — APPOINTMENT (OUTPATIENT)
Dept: CARDIOLOGY | Facility: CLINIC | Age: 71
End: 2021-01-19
Payer: MEDICARE

## 2021-01-19 VITALS
HEIGHT: 65 IN | BODY MASS INDEX: 27.49 KG/M2 | DIASTOLIC BLOOD PRESSURE: 70 MMHG | TEMPERATURE: 97.3 F | HEART RATE: 69 BPM | SYSTOLIC BLOOD PRESSURE: 126 MMHG | OXYGEN SATURATION: 99 % | WEIGHT: 165 LBS

## 2021-01-19 PROCEDURE — 99214 OFFICE O/P EST MOD 30 MIN: CPT

## 2021-01-19 RX ORDER — PRAVASTATIN SODIUM 20 MG/1
20 TABLET ORAL
Qty: 90 | Refills: 0 | Status: DISCONTINUED | COMMUNITY
Start: 2020-08-21 | End: 2021-01-19

## 2021-01-19 NOTE — HISTORY OF PRESENT ILLNESS
[FreeTextEntry1] : ERIBERTO RAMOS  is a 70 year old  F with a history of hypothyroidism with thyroid nodules, negative thyroid biopsy, hyperlipidemia, atherosclerosis, mild VHD and osteopenia/osteoporosis. Additional hx of knee replacement.\par \par There is no prior history of a clinical myocardial infarction, coronary revascularization. \par There is no history of symptomatic congestive heart failure rheumatic heart disease or valvular disease.\par There is no history of symptomatic arrhythmias including atrial fibrillation.\par \par \par Last seen 10/1/20. Presents today, 1/19/21, for follow up. In the interim there have been no hospitalizations or procedures. She denies chest pain, pressure, palpitations, unusual shortness of breath, orthopnea, LE edema, lightheadedness, dizziness, near syncope or syncope. There are myalgias on statin, BL arm pain mostly at night. Not currently on a formal exercise regimen. Remote smoking hx 40 years ago. \par \par Previously had intolerance to Lipitor.  She is having similar symptoms with rosuvastatin and now myalgias on Pravastatin.\par \par Of note, when labs were done 11/2020, she was mistakenly taking Pravastatin 20mg daily and not 40mg daily. It was increased to 40mg 11/23/20. Repeat b/w 1/5/21 still with elevated chol on Pravastatin 40mg daily.\par \par \par Testing:\par \par Labs 1/14/21: Cr 0.84, K 4.3, Ca 9.4, AST 19, ALT 14, CK 77, Chol 239, Trigs 137, , HDL 55\par \par Echo 9/22/20: EF 65%. Mild MR. Normal wall motion. Mild TR. Mild [place]. A color doppler signal is seen along the atrial septum c/w PFO or small atrial septal defect. Compared with echo 11/4/19, no significant changes noted.\par \par Carotids 9/22/20: Mild nonobstructive carotid dz BL. \par \par Labs 11/23/20: AST 20, ALT 14, Chol 330, Trigs 164, \par \par Labs 8/22/20: WBC 5.95, Hgb 13.7, Hct 41.4, plt 243, K 4.4, Cr 0.7, AST 21, ALT 16, Trigs 119, HDL 63, , Chol 201, Mag 2.3, Hgb A1C 5.5, CRP 0.10, CK 90, BNP 85.8, TSH 2.060.\par \par Blood work September 2019 potassium 5.2 creatinine 0.6 total cholesterol 195  hemoglobin 14.4.  \par Last EKG describes nonspecific ST changes \par nuclear stress test in November 2019 no ischemia \par echocardiogram November 2019 normal left ventricular function mild valvular heart disease borderline pulmonary pressures and possible PFO.  \par Carotid Doppler. September 2018. Mild plaque. Nonobstructive disease.

## 2021-01-19 NOTE — ASSESSMENT
[FreeTextEntry1] : ERIBERTO RAMOS is a 70 year old F who presents today Jan 19, 2021 with the above history and the following active issues:\par \par Echocardiogram reviewed. \par Normal left ventricular function. \par Mild valvular heart disease.\par Possible intra-atrial shunting.  \par Monitor pulmonary pressures and right-sided structures. \par Continue ASA 81mg daily.\par \par Hyperlipidemia. \par Atherosclerosis\par ADR to lipitor and now crestor and now myalgias Pravastatin. Recommend stopping Pravastatin and Starting Repatha and Zetia. Fasting b/w in 6-8 weeks to include LPa. \par Adjunctive dietary measures. \par \par Calcium ordered for further risk stratification.\par \par Ongoing f/u with PCP.\par \par F/U after testing to review Calcium score and blood work.\par Discussed red flag symptoms, which would warrant sooner or emergent medical evaluation.\par Any questions and concerns were addressed and resolved.\par \par Sincerely,\par Kaylie Zheng FNP-BC\par Patient's history, testing, and plan was reviewed with supervising physician, Dr. William Ledezma\par

## 2021-01-19 NOTE — ADDENDUM
[FreeTextEntry1] : Suspected familial hyperlipidemia\par Known atherosclerosis. \par Multiple trials of lipid-lowering therapy with symptomatic intolerance. \par Re challenge with multiple statins with reappearance of her symptoms.\par Continues to experience myalgias which improve with removal of therapy\par Started concomitant Zetia.\par Compliant with lifestyle measures including diet exercise\par Excellent candidate for PCSK9 inhibition\par Prior authorization pending\par Will return to office with RN for initial medication administration and education.\par

## 2021-01-19 NOTE — REVIEW OF SYSTEMS
[see HPI] : see HPI [Negative] : Heme/Lymph [Joint Pain] : no joint pain [Joint Stiffness] : no joint stiffness [FreeTextEntry1] : myalgias

## 2021-01-25 ENCOUNTER — NON-APPOINTMENT (OUTPATIENT)
Age: 71
End: 2021-01-25

## 2021-01-25 RX ORDER — EVOLOCUMAB 140 MG/ML
140 INJECTION, SOLUTION SUBCUTANEOUS
Qty: 6 | Refills: 1 | Status: DISCONTINUED | COMMUNITY
Start: 2021-01-19 | End: 2021-01-25

## 2021-02-25 ENCOUNTER — OUTPATIENT (OUTPATIENT)
Dept: OUTPATIENT SERVICES | Facility: HOSPITAL | Age: 71
LOS: 1 days | End: 2021-02-25
Payer: MEDICARE

## 2021-02-25 PROCEDURE — 76536 US EXAM OF HEAD AND NECK: CPT | Mod: 26

## 2021-03-09 ENCOUNTER — NON-APPOINTMENT (OUTPATIENT)
Age: 71
End: 2021-03-09

## 2021-03-23 ENCOUNTER — NON-APPOINTMENT (OUTPATIENT)
Age: 71
End: 2021-03-23

## 2021-03-23 ENCOUNTER — APPOINTMENT (OUTPATIENT)
Dept: CARDIOLOGY | Facility: CLINIC | Age: 71
End: 2021-03-23
Payer: MEDICARE

## 2021-03-23 VITALS
HEART RATE: 82 BPM | HEIGHT: 65 IN | WEIGHT: 168 LBS | OXYGEN SATURATION: 96 % | SYSTOLIC BLOOD PRESSURE: 136 MMHG | DIASTOLIC BLOOD PRESSURE: 70 MMHG | TEMPERATURE: 97.3 F | BODY MASS INDEX: 27.99 KG/M2

## 2021-03-23 DIAGNOSIS — R94.31 ABNORMAL ELECTROCARDIOGRAM [ECG] [EKG]: ICD-10-CM

## 2021-03-23 DIAGNOSIS — I34.0 NONRHEUMATIC MITRAL (VALVE) INSUFFICIENCY: ICD-10-CM

## 2021-03-23 DIAGNOSIS — I65.29 OCCLUSION AND STENOSIS OF UNSPECIFIED CAROTID ARTERY: ICD-10-CM

## 2021-03-23 DIAGNOSIS — Z86.39 PERSONAL HISTORY OF OTHER ENDOCRINE, NUTRITIONAL AND METABOLIC DISEASE: ICD-10-CM

## 2021-03-23 PROCEDURE — 93000 ELECTROCARDIOGRAM COMPLETE: CPT

## 2021-03-23 PROCEDURE — 99214 OFFICE O/P EST MOD 30 MIN: CPT

## 2021-03-23 RX ORDER — EZETIMIBE 10 MG/1
10 TABLET ORAL DAILY
Qty: 90 | Refills: 1 | Status: DISCONTINUED | COMMUNITY
Start: 2021-01-19 | End: 2021-03-23

## 2021-03-23 NOTE — ADDENDUM
[FreeTextEntry1] : Please note the patient was reviewed with NP Kaylie melchor.\jasmyn I was physically present during the service of the patient.\ajsmyn I was directly involved in the management plan and recommendations of the care provided to the patient. \jasmyn I personally reviewed the history and physical examination as documented by the NP above.\par Mar 23 2021  2:00PM\par

## 2021-03-23 NOTE — ASSESSMENT
[FreeTextEntry1] : ERIBERTO RAMOS is a 70 year old F who presents today Mar 23, 2021 with the above history and the following active issues:\par \par \par Echocardiogram reviewed. \par Normal left ventricular function. \par Mild valvular heart disease.\par Possible intra-atrial shunting.  \par Monitor pulmonary pressures and right-sided structures. \par Continue ASA 81mg daily.\par \par \par Calcium ordered 28.\par Hyperlipidemia. \par Atherosclerosis\par ADR to Lipitor, Crestor, Pravastatin. Remain off Zetia. Continue Repatha. Note myalgias improving off Zetia. However, Lyme testing ordered today. Patient advised to establish care with a PCP to investigate other etiologies of fatigue. Name given to Dr. Jeb Laird.\par Adjunctive dietary measures. \par \par \par Ongoing f/u with PCP.\par \par F/U: She will be called with results of her blood work. She will call us if she does not hear from the office. She will see Dr. Ledezma in 6 months.\par Discussed red flag symptoms, which would warrant sooner or emergent medical evaluation.\par Any questions and concerns were addressed and resolved.\par \par Sincerely,\par Kaylie Zheng FNP-BC\par Patient's history, testing, and plan was reviewed with supervising physician, Dr. William Ledezma\par

## 2021-03-23 NOTE — HISTORY OF PRESENT ILLNESS
[FreeTextEntry1] : ERIBERTO RAMOS  is a 70 year old  F with a history of hypothyroidism with thyroid nodules (follows with Dr. Toussaint), negative thyroid biopsy, hyperlipidemia, atherosclerosis, mild VHD and osteopenia/osteoporosis. Additional hx of knee replacement.\par \par There is no prior history of a clinical myocardial infarction, coronary revascularization. \par There is no history of symptomatic congestive heart failure rheumatic heart disease or valvular disease.\par There is no history of symptomatic arrhythmias including atrial fibrillation.\par \par \par \par Last seen 1/19/21. Pravastatin was stopped due to myalgias. Repatha and Zetia started. Calcium score and labs ordered. See details below. Presents today, 3/23/21, for follow up. Patient called 3/9/21 with complaints of joint pain and feeling "down". Stopped Zetia. Sx's mostly resolved. Advised to f/u with PCP re: joint pain. She denies chest pain, pressure, palpitations, unusual shortness of breath, orthopnea, LE edema, lightheadedness, dizziness, near syncope or syncope. Still reports generalized aches and pains. Denies known recent tick bites, but [place]. Remote smoking history. Walks 1 mile 5x a week without exertional complaints.\par \par /72 on my exam.\par \par Recent labs done last week have been requested.\par \par \par Testing:\par \par EKG 3/23/21: SR at 76 bpm, PRWP, nonspecific ST-T wave abnormalities,  ms, QTc 412 ms\par \par Calcium score 2/17/21: Total calcium score of 28. No active pulm dz.\par \par Labs 1/14/21: Cr 0.84, K 4.3, Ca 9.4, AST 19, ALT 14, CK 77, Chol 239, Trigs 137, , HDL 55\par \par \par Echo 9/22/20: EF 65%. Mild MR. Normal wall motion. Mild TR. Mild [place]. A color doppler signal is seen along the atrial septum c/w PFO or small atrial septal defect. Compared with echo 11/4/19, no significant changes noted.\par \par Carotids 9/22/20: Mild nonobstructive carotid dz BL. \par \par Labs 11/23/20: AST 20, ALT 14, Chol 330, Trigs 164, \par \par Labs 8/22/20: WBC 5.95, Hgb 13.7, Hct 41.4, plt 243, K 4.4, Cr 0.7, AST 21, ALT 16, Trigs 119, HDL 63, , Chol 201, Mag 2.3, Hgb A1C 5.5, CRP 0.10, CK 90, BNP 85.8, TSH 2.060.\par \par Blood work September 2019 potassium 5.2 creatinine 0.6 total cholesterol 195  hemoglobin 14.4.  \par Last EKG describes nonspecific ST changes \par nuclear stress test in November 2019 no ischemia \par echocardiogram November 2019 normal left ventricular function mild valvular heart disease borderline pulmonary pressures and possible PFO.  \par Carotid Doppler. September 2018. Mild plaque. Nonobstructive disease.

## 2021-04-07 ENCOUNTER — NON-APPOINTMENT (OUTPATIENT)
Age: 71
End: 2021-04-07

## 2021-09-10 ENCOUNTER — NON-APPOINTMENT (OUTPATIENT)
Age: 71
End: 2021-09-10

## 2021-09-10 ENCOUNTER — APPOINTMENT (OUTPATIENT)
Dept: OPHTHALMOLOGY | Facility: CLINIC | Age: 71
End: 2021-09-10
Payer: MEDICARE

## 2021-09-10 PROCEDURE — 92014 COMPRE OPH EXAM EST PT 1/>: CPT

## 2021-09-16 ENCOUNTER — RX RENEWAL (OUTPATIENT)
Age: 71
End: 2021-09-16

## 2021-09-16 RX ORDER — EVOLOCUMAB 140 MG/ML
140 INJECTION, SOLUTION SUBCUTANEOUS
Qty: 6 | Refills: 1 | Status: ACTIVE | COMMUNITY
Start: 2021-01-25 | End: 1900-01-01

## 2021-09-24 ENCOUNTER — APPOINTMENT (OUTPATIENT)
Dept: CARDIOLOGY | Facility: CLINIC | Age: 71
End: 2021-09-24
Payer: MEDICARE

## 2021-09-24 VITALS
TEMPERATURE: 97.7 F | OXYGEN SATURATION: 98 % | BODY MASS INDEX: 28.16 KG/M2 | HEIGHT: 65 IN | WEIGHT: 169 LBS | HEART RATE: 92 BPM | SYSTOLIC BLOOD PRESSURE: 138 MMHG | DIASTOLIC BLOOD PRESSURE: 74 MMHG

## 2021-09-24 DIAGNOSIS — E78.5 HYPERLIPIDEMIA, UNSPECIFIED: ICD-10-CM

## 2021-09-24 DIAGNOSIS — I25.10 ATHEROSCLEROTIC HEART DISEASE OF NATIVE CORONARY ARTERY W/OUT ANGINA PECTORIS: ICD-10-CM

## 2021-09-24 DIAGNOSIS — Q21.1 ATRIAL SEPTAL DEFECT: ICD-10-CM

## 2021-09-24 DIAGNOSIS — I25.84 ATHEROSCLEROTIC HEART DISEASE OF NATIVE CORONARY ARTERY W/OUT ANGINA PECTORIS: ICD-10-CM

## 2021-09-24 PROCEDURE — 99214 OFFICE O/P EST MOD 30 MIN: CPT

## 2021-09-24 NOTE — REASON FOR VISIT
[Structural Heart and Valve Disease] : structural heart and valve disease [Hyperlipidemia] : hyperlipidemia [Coronary Artery Disease] : coronary artery disease

## 2021-09-26 NOTE — HISTORY OF PRESENT ILLNESS
[FreeTextEntry1] : ERIBERTO RAMOS  is a 70 year old  F\par \par with a history of hypothyroidism with thyroid nodules (follows with Dr. Toussaint), negative thyroid biopsy, hyperlipidemia, atherosclerosis, mild VHD and osteopenia/osteoporosis. Additional hx of knee replacement.\par \par There is no prior history of a clinical myocardial infarction, coronary revascularization. \par There is no history of symptomatic congestive heart failure rheumatic heart disease or valvular disease.\par There is no history of symptomatic arrhythmias including atrial fibrillation.\par \par Intol of multiple statins due to myalgias. \par Repatha and Zetia started. \par Joint pain and feeling "down". Stopped Zetia. Sx's mostly resolved. \par \par She denies chest pain, pressure, palpitations, unusual shortness of breath, orthopnea, LE edema, lightheadedness, dizziness, near syncope or syncope.\par Walks 5x a week without exertional complaints.\par \par She is planning to move to [place] there is associated stress with the move \par she has been less active with her exercise program \par she is compliant with her Repatha \par nursing education has been provided regarding the use of the Repatha \par \par EKG 3/23/21: SR at 76 bpm, PRWP, nonspecific ST-T wave abnormalities,  ms, QTc 412 ms\par Calcium score 2/17/21: Total calcium score of 28. No active pulm dz.\par Labs 1/14/21: Cr 0.84, K 4.3, Ca 9.4, AST 19, ALT 14, CK 77, Chol 239, Trigs 137, , HDL 55\par Echo 9/22/20: EF 65%. Mild MR. Normal wall motion. Mild TR. Mild MI. A color doppler signal is seen along the atrial septum c/w PFO or small atrial septal defect. Compared with echo 11/4/19, no significant changes noted.\par Carotids 9/22/20: Mild nonobstructive carotid dz BL. \par Labs 11/23/20: AST 20, ALT 14, Chol 330, Trigs 164, \par nuclear stress test in November 2019 no ischemia \par \par

## 2021-09-26 NOTE — ASSESSMENT
[FreeTextEntry1] : Echocardiogram reviewed. \par Normal left ventricular function. \par Mild valvular heart disease.\par Possible interatrial shunting.  \par Monitor pulmonary pressures and right-sided structures. \par Continue ASA 81mg daily.\par \par Hyperlipidemia. \par CAC\par Atherosclerosis\par ADR to Lipitor, Crestor, Pravastatin.\par Remain off Zetia. Note myalgias improving\par Continue Repatha.  \par Adjunctive dietary measures. \par \par follow-up blood work has been requested \par discussed importance of establishing cardiology care after her move for continuity of care\par \par Discussed red flag symptoms, which would warrant sooner or emergent medical evaluation.\par Any questions and concerns were addressed and resolved.
